# Patient Record
Sex: FEMALE | Race: WHITE | NOT HISPANIC OR LATINO
[De-identification: names, ages, dates, MRNs, and addresses within clinical notes are randomized per-mention and may not be internally consistent; named-entity substitution may affect disease eponyms.]

---

## 2020-03-05 PROBLEM — Z00.00 ENCOUNTER FOR PREVENTIVE HEALTH EXAMINATION: Status: ACTIVE | Noted: 2020-03-05

## 2020-05-19 ENCOUNTER — APPOINTMENT (OUTPATIENT)
Dept: ENDOCRINOLOGY | Facility: CLINIC | Age: 60
End: 2020-05-19
Payer: COMMERCIAL

## 2020-05-19 VITALS
HEART RATE: 65 BPM | SYSTOLIC BLOOD PRESSURE: 115 MMHG | HEIGHT: 59 IN | WEIGHT: 125 LBS | DIASTOLIC BLOOD PRESSURE: 73 MMHG | BODY MASS INDEX: 25.2 KG/M2

## 2020-05-19 DIAGNOSIS — Z87.891 PERSONAL HISTORY OF NICOTINE DEPENDENCE: ICD-10-CM

## 2020-05-19 DIAGNOSIS — Z83.49 FAMILY HISTORY OF OTHER ENDOCRINE, NUTRITIONAL AND METABOLIC DISEASES: ICD-10-CM

## 2020-05-19 PROCEDURE — 36415 COLL VENOUS BLD VENIPUNCTURE: CPT

## 2020-05-19 PROCEDURE — 99205 OFFICE O/P NEW HI 60 MIN: CPT | Mod: 25

## 2020-05-19 RX ORDER — CALCIUM CITRATE/VITAMIN D3 315MG-6.25
TABLET ORAL
Refills: 0 | Status: ACTIVE | COMMUNITY

## 2020-05-19 RX ORDER — MULTIVITAMIN
TABLET ORAL
Refills: 0 | Status: ACTIVE | COMMUNITY

## 2020-05-20 LAB
25(OH)D3 SERPL-MCNC: 42.1 NG/ML
ALBUMIN MFR SERPL ELPH: 65.1 %
ALBUMIN SERPL ELPH-MCNC: 4.7 G/DL
ALBUMIN SERPL-MCNC: 4.5 G/DL
ALBUMIN/GLOB SERPL: 1.9 RATIO
ALBUPE: 14 %
ALP BLD-CCNC: 56 U/L
ALPHA1 GLOB MFR SERPL ELPH: 3.5 %
ALPHA1 GLOB SERPL ELPH-MCNC: 0.2 G/DL
ALPHA1UPE: 41.2 %
ALPHA2 GLOB MFR SERPL ELPH: 8.7 %
ALPHA2 GLOB SERPL ELPH-MCNC: 0.6 G/DL
ALPHA2UPE: 22.5 %
ALT SERPL-CCNC: 17 U/L
ANION GAP SERPL CALC-SCNC: 14 MMOL/L
AST SERPL-CCNC: 24 U/L
B-GLOBULIN MFR SERPL ELPH: 10.6 %
B-GLOBULIN SERPL ELPH-MCNC: 0.7 G/DL
BETAUPE: 16 %
BILIRUB SERPL-MCNC: 0.5 MG/DL
BUN SERPL-MCNC: 15 MG/DL
CALCIUM SERPL-MCNC: 9.9 MG/DL
CALCIUM SERPL-MCNC: 9.9 MG/DL
CHLORIDE SERPL-SCNC: 103 MMOL/L
CO2 SERPL-SCNC: 26 MMOL/L
CREAT SERPL-MCNC: 0.85 MG/DL
GAMMA GLOB FLD ELPH-MCNC: 0.8 G/DL
GAMMA GLOB MFR SERPL ELPH: 12.1 %
GAMMAUPE: 6.3 %
GLUCOSE SERPL-MCNC: 85 MG/DL
IGA 24H UR QL IFE: NORMAL
INTERPRETATION SERPL IEP-IMP: NORMAL
KAPPA LC 24H UR QL: NORMAL
MAGNESIUM SERPL-MCNC: 2.2 MG/DL
PARATHYROID HORMONE INTACT: 48 PG/ML
PHOSPHATE SERPL-MCNC: 3.4 MG/DL
POTASSIUM SERPL-SCNC: 4.3 MMOL/L
PROT PATTERN 24H UR ELPH-IMP: NORMAL
PROT SERPL-MCNC: 6.9 G/DL
PROT UR-MCNC: 10 MG/DL
PROT UR-MCNC: 10 MG/DL
SODIUM SERPL-SCNC: 144 MMOL/L
TSH SERPL-ACNC: 1.7 UIU/ML

## 2020-05-21 LAB — ALP BONE SERPL-MCNC: 8.7 MCG/L

## 2021-08-24 NOTE — ASSESSMENT
[FreeTextEntry1] : Osteopenia. She has no history of fragility fracture. She has a history of prior osteoporosis therapy with risedronate and currently alendronate approaching a five year treatment course. Her last bone density demonstrated a decline at the hip, which may have been due in part to a slight difference in positioning. Her 10 year fracture risk calculated by FRAX using her last bone density is 8.8% for major osteoporotic fracture and 1.0% for hip fracture, below the treatment thresholds. We discussed completion of a metabolic evaluation for secondary causes of bone loss. We discussed the potential benefits and risks of antiresorptive osteoporosis therapy at length, including but not limited to osteonecrosis of the jaw and atypical femoral fracture. We discussed the risks and benefits of a "drug holiday" from antiresorptive therapy and she is amenable pending interval bone density testing in August 2020. \par Metabolic evaluation for secondary causes of osteoporosis\par Calcium 1200 mg daily from diet and supplements (to be taken in divided doses as no more than 500-600 mg can be absorbed at one time); advised decreased supplemental calcium\par Continue current vitamin D regimen pending level\par Diet, exercise and fall prevention discussed\par \par Hypothyroidism. She has been clinically and biochemically euthyroid on her current regimen of levothyroxine. We reviewed proper use and compliance with levothyroxine. \par Continue Synthroid 62.5 mcg daily pending TSH today\par \par I reviewed the DXA performed on August 19, 2019 with the patient today.\par I reviewed the laboratories performed on August 15, 2019 with the patient today.\par I counseled the patient regarding calcium and vitamin D intake today.\par I discussed the following osteoporosis therapies: Alendronate, risedronate, ibandronate, zoledronic acid, denosumab\par \par CC:\par Dr. Victor Manuel Zhang, Fax [phone]

## 2021-08-24 NOTE — PHYSICAL EXAM
[Alert] : alert [Healthy Appearance] : healthy appearance [No Acute Distress] : no acute distress [Normal Sclera/Conjunctiva] : normal sclera/conjunctiva [Normal Oropharynx] : the oropharynx was normal [No Neck Mass] : no neck mass was observed [No LAD] : no lymphadenopathy [Supple] : the neck was supple [No Thyroid Nodules] : no palpable thyroid nodules [Thyroid Not Enlarged] : the thyroid was not enlarged [No Respiratory Distress] : no respiratory distress [Clear to Auscultation] : lungs were clear to auscultation bilaterally [Normal Rate] : heart rate was normal [Normal S1, S2] : normal S1 and S2 [Regular Rhythm] : with a regular rhythm [No Spinal Tenderness] : no spinal tenderness [Normal Gait] : normal gait [No Stigmata of Cushings Syndrome] : no stigmata of Cushings Syndrome [Normal Insight/Judgement] : insight and judgment were intact [Kyphosis] : no kyphosis present [Scoliosis] : no scoliosis [Acanthosis Nigricans] : no acanthosis nigricans [de-identified] : no moon facies, no supraclavicular fat pads [de-identified] : no difficulty balancing on one leg bilaterally

## 2021-08-24 NOTE — HISTORY OF PRESENT ILLNESS
[FreeTextEntry1] : Ms. Jackson is a 59 year-old woman with a history of osteopenia and hypothyroidism presenting to establish care with me. Over 20 pages of previous records reviewed. 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 per notes\par \par Falls: No\par Height loss: No\par Kidney stones: No\par Dental health: Regular appointments, no history of implants, no upcoming procedures planned\par Exercise: Walks 4 miles daily\par Dairy intake: 1 serving daily (chocolate milk, yogurt or cheese daily)\par Calcium supplements: [employer] calcium carbonate 600 mg (vitamin D 500 intl units)\par Multivitamin: One A Day Women 50+ (calcium 300 mg, vitamin D 1000 intl units)\par Vitamin D supplements: in calcium supplement and multivitamin\par \par Osteoporosis risk factors include: Postmenopausal status,  race, prior fracture, falls, height loss, small thin bones, tobacco use, excessive alcohol, anorexia, family history, vitamin D deficiency, corticosteroid use, seizure medications, malabsorption, hyperparathyroidism, hyperthyroidism.\par NEGATIVE EXCEPT: Postmenopausal status,  race\par \par Hypothyroidism.\par She was diagnosed with hypothyroidism over 10 years ago.\par She is taking Synthroid 62.5 mcg daily (1/2 125 mcg tablet) daily.\par She is taking levothyroxine in the morning, on an empty stomach, with plain water, and waiting at least 30 minutes before eating. She is taking calcium/iron/multivitamin and  from levothyroxine by at least 1 hour.\par No history of radiation exposure.\par Mother with history of hyperthyroidism.

## 2021-08-24 NOTE — DATA REVIEWED
[FreeTextEntry1] : Laboratories (August 15, 2019) reviewed and significant for: \par Calcium 9.5 mg/dL (albumin 4.8 g/dL)\par 25-hydroxyvitamin D 40 ng/mL\par eGFR >60 mL/min\par Alkaline phosphatase 52 U/L\par Phosphorus 3.4 mg/dL\par TSH 2.93 uIU/mL

## 2021-08-24 NOTE — RESULTS/DATA
[Hologic] : hologic [Other: ________] : [unfilled] [BMD ___ g/cm2] : BMD: [unfilled] g/cm2 [T-Score ___] : T-score: [unfilled] [FreeTextEntry2] : August 19, 2019 [de-identified] : -2.1% [de-identified] : -3.4%*

## 2021-08-24 NOTE — ADDENDUM
[FreeTextEntry1] : Recent test results as below; unremarkable for a secondary cause of bone loss. TSH within range; refills for Synthroid sent to mail order pharmacy. Serum and urine protein electrophoresis without monoclonal proteins. I left a message for Ms. Breen to discuss these reassuring results. 5/20/20\par \par Recent bone density as below, overall stable from previous. Her 10 year fracture risk calculated by FRAX (if untreated) is 9.4% for major osteoporotic fracture and 1.2% for hip fracture, below the treatment thresholds. We will start a "drug holiday" from antiresorptive therapy. 8/25/20\par \par Ms. Breen needs a dental extraction. I spoke with her dentist, Dr. Miguel Angel Grant. 3/23/21\par \par Synthroid denied by insurance and Ms. Breen had no specific preference over generic levothyroxine. 6/03/21\par \par Recent laboratory results as below. Calciotropic panel within range. TSH within range. I will discuss with Ms. Breen at her upcoming appointment. 8/16/21\par \par Recent bone density as below. Bone density demonstrated a significant improvement at the lumbar spine and a decline at the hip, likely due to positioning. I will discuss with Ms. Breen at her upcoming appointment. 8/24/21\par \par Most recent bone mineral density\par Date: August 19, 2021\par Source: Hologic\par Site: [facility]\par \par Site	BMD (g/cm2)	T-score	Change previous	Change baseline	\par Lumbar spine	1.006	-0.4\par Femoral neck	0.633	-1.9	\par Total hip	                0.817       -1.0       \par Distal radius           	0.558       -2.3         \par DXA Comments: L4 incorrectly excluded on the report\par \par Laboratories (August 10, 2021) reviewed and significant for: \par Calcium 9.7 mg/dL (albumin 4.3 g/dL)\par PTH 64 pg/mL\par 25-hydroxyvitamin D 33 ng/mL\par BUN/creatinine 18/0.88 mg/dL (eGFR 70 mL/min)\par Alkaline phosphatase 70 U/L\par Phosphorus 4.0 mg/dL\par Magnesium 2.2 mg/dL\par TSH 3.47 uIU/mL\par \par Most recent bone mineral density\par Date: August 19, 2020\par Source: Hologic\par Site: [facility]\par \par Site	BMD (g/cm2)	T-score	Change previous	Change baseline	\par Lumbar spine	0.962	-0.8\par Femoral neck	0.626	-2.0	\par Total hip	                0.876       -0.5         \par Distal radius           	0.559       -2.3         \par DXA Comments:

## 2021-08-26 ENCOUNTER — NON-APPOINTMENT (OUTPATIENT)
Age: 61
End: 2021-08-26

## 2021-08-26 ENCOUNTER — APPOINTMENT (OUTPATIENT)
Dept: ENDOCRINOLOGY | Facility: CLINIC | Age: 61
End: 2021-08-26
Payer: COMMERCIAL

## 2021-08-26 VITALS
DIASTOLIC BLOOD PRESSURE: 68 MMHG | HEART RATE: 54 BPM | SYSTOLIC BLOOD PRESSURE: 105 MMHG | WEIGHT: 123 LBS | BODY MASS INDEX: 24.8 KG/M2 | HEIGHT: 59 IN

## 2021-08-26 PROCEDURE — 99214 OFFICE O/P EST MOD 30 MIN: CPT

## 2021-08-26 RX ORDER — ALENDRONATE SODIUM 70 MG/1
70 TABLET ORAL
Refills: 0 | Status: DISCONTINUED | COMMUNITY
End: 2021-08-26

## 2022-07-26 ENCOUNTER — RX RENEWAL (OUTPATIENT)
Age: 62
End: 2022-07-26

## 2022-08-24 NOTE — HISTORY OF PRESENT ILLNESS
[FreeTextEntry1] : Ms. Jackson is a 61 year-old woman with a history of osteopenia and hypothyroidism presenting for follow-up of her endocrine issues. 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 per notes\par \par Falls: No\par Height loss: No\par Kidney stones: No\par Dental health: Regular appointments, no history of implants, no upcoming procedures planned\par Exercise: Walks 4 miles daily\par Dairy intake: 1 serving daily (chocolate milk, yogurt or cheese daily)\par Calcium supplements: [employer] calcium carbonate 600 mg (vitamin D 500 intl units)\par Multivitamin: One A Day Women 50+ (calcium 300 mg, vitamin D 1000 intl units)\par Vitamin D supplements: in calcium supplement and multivitamin\par \par Osteoporosis risk factors include: Postmenopausal status,  race, prior fracture, falls, height loss, small thin bones, tobacco use, excessive alcohol, anorexia, family history, vitamin D deficiency, corticosteroid use, seizure medications, malabsorption, hyperparathyroidism, hyperthyroidism.\par NEGATIVE EXCEPT: Postmenopausal status,  race\par \par Hypothyroidism.\par She was diagnosed with hypothyroidism over 10 years ago.\par She is taking Synthroid 62.5 mcg daily (1/2 125 mcg tablet) daily; her specialty pharmacy is giving her brand-name despite being written for generic.\par She is taking levothyroxine in the morning, on an empty stomach, with plain water, and waiting at least 30 minutes before eating. She is taking calcium/iron/multivitamin and  from levothyroxine by at least one hour.\par No history of radiation exposure.\par Mother with history of hyperthyroidism.\par \par Interim History \par She had a right heel fracture in May 2021 jumping off her bed; she has had good healing and is now without pain. \par She needed a dental extraction and crown placements. \par Recent laboratory results as below. Calciotropic panel within range. TSH within range. \par Recent bone density as below. Bone density demonstrated an improvement at the lumbar spine and a decline at the hip, likely due to positioning. \par She feels well today. She feels as it she has more energy than previous. \par Medical and surgical history, medications, allergies, social and family history reviewed and updated as needed.

## 2022-08-24 NOTE — DATA REVIEWED
[FreeTextEntry1] : Laboratories (August 10, 2021) reviewed and significant for: \par Calcium 9.7 mg/dL (albumin 4.3 g/dL)\par PTH 64 pg/mL\par 25-hydroxyvitamin D 33 ng/mL\par BUN/creatinine 18/0.88 mg/dL (eGFR 70 mL/min)\par Alkaline phosphatase 70 U/L\par Phosphorus 4.0 mg/dL\par Magnesium 2.2 mg/dL\par TSH 3.47 uIU/mL\par \par Most recent bone mineral density\par Date: August 19, 2021\par Source: [employer]\par Site: [facility]\par \par Site	BMD (g/cm2)	T-score	Change previous	Change baseline	\par Lumbar spine	1.006	-0.4\par Femoral neck	0.633	-1.9	\par Total hip	                0.817       -1.0       \par Distal radius           	0.558       -2.3         \par DXA Comments: L4 incorrectly excluded on the report\par \par Most recent bone mineral density\par Date: August 19, 2020\par Source: [employer]\par Site: [facility]\par \par Site	BMD (g/cm2)	T-score	Change previous	Change baseline	\par Lumbar spine	0.962	-0.8\par Femoral neck	0.626	-2.0	\par Total hip	                0.876       -0.5         \par Distal radius           	0.559       -2.3         \par DXA Comments:

## 2022-08-24 NOTE — ADDENDUM
[FreeTextEntry1] : Recent laboratory results ordered by her primary care provider and our office as below. PTH borderline elevated with normal serum calcium; calciotropic panel otherwise within range. I will discuss with Ms. Breen at her upcoming appointment. 8/11/22\par \par Recent bone density as below. Bone density demonstrated a significant improvement at the total hip from previous, other sites overall stable. I will discuss with Ms. Breen at her upcoming appointment. 8/24/22\par \par Laboratories (August 8, 2022) reviewed and significant for: \par Unremarkable complete blood count\par Calcium 9.3 mg/dL (albumin 4.3 g/dL)\par PTH 80 pg/mL (normal: 16-77)\par 25-hydroxyvitamin D 42 ng/mL\par BUN/creatinine 15/0.82 mg/dL (eGFR 80 mL/min)\par Alkaline phosphatase 63 U/L\par Bone-specific alkaline phosphatase 11.6 mcg/L\par Phosphorus 3.7 mg/dL\par Magnesium 2.2 mg/dL\par \par Most recent bone mineral density\par Date: August 18, 2022\par Source: Hologic\par Site: [facility]\par \par Site      BMD (g/cm2)  T-score            Change previous         Change baseline         \par Lumbar spine  0.964   -0.5                 +0.9% \par Femoral neck  0.627   -2.0      \par Total hip          0.864   -0.6                 +5.8%* \par Distal radius   0.548   -2.4                  -1.8%\par DXA comments: *Significant change from previous; L4 excluded\par Vertebral fracture assessment: No vertebral compression fractures from T9 to L5 (my read)\par \par Impression: I have personally reviewed the DXA images and report, and I compared these images to those from a DXA dated August 19, 2021 from [facility]. There is osteopenia at the femoral neck and distal radius. There was a significant improvement at the total hip due in large part to positioning; no other significant changes from previous. No vertebral fractures noted on vertebral fracture assessment.

## 2022-08-24 NOTE — PHYSICAL EXAM
[Alert] : alert [Healthy Appearance] : healthy appearance [No Acute Distress] : no acute distress [Normal Sclera/Conjunctiva] : normal sclera/conjunctiva [Normal Hearing] : hearing was normal [Normal Oropharynx] : the oropharynx was normal [No Respiratory Distress] : no respiratory distress [No Spinal Tenderness] : no spinal tenderness [No Stigmata of Cushings Syndrome] : no stigmata of Cushings Syndrome [Normal Gait] : normal gait [Normal Insight/Judgement] : insight and judgment were intact [Kyphosis] : no kyphosis present [Scoliosis] : no scoliosis [Acanthosis Nigricans] : no acanthosis nigricans [de-identified] : no moon facies, no supraclavicular fat pads

## 2022-08-24 NOTE — ASSESSMENT
[FreeTextEntry1] : Osteopenia. She has a history of traumatic heel fracture; no history of fragility fracture. She has a history of prior osteoporosis therapy with risedronate and alendronate for a five year treatment course. We started a "drug holiday" from antiresorptive therapy in 2020. Metabolic evaluation for secondary causes of bone loss previously unremarkable. Recent bone density demonstrated an improvement at the lumbar spine and a decline at the hip, likely due to positioning. We will continue a "drug holiday" from antiresorptive therapy for now.\par Calcium 1200 mg daily from diet and supplements (to be taken in divided doses as no more than 500-600 mg can be absorbed at one time)\par Continue current vitamin D regimen\par Diet, exercise and fall prevention discussed\par \par Hypothyroidism. She has been clinically and biochemically euthyroid on her current regimen of levothyroxine. We reviewed proper use and compliance with levothyroxine. \par Continue Synthroid 62.5 mcg daily\par \par Next bone density testin year\par Next appointment: 1 year or earlier as needed\par \par I reviewed the DXA performed on August 10, 2021 with the patient today.\par I reviewed the laboratories performed on 2021 with the patient today.\par I counseled the patient regarding calcium and vitamin D intake today.\par \par CC:\par Dr. Victor Manuel Zhang, Fax [phone]

## 2022-08-31 ENCOUNTER — APPOINTMENT (OUTPATIENT)
Dept: ENDOCRINOLOGY | Facility: CLINIC | Age: 62
End: 2022-08-31

## 2022-08-31 VITALS
SYSTOLIC BLOOD PRESSURE: 102 MMHG | WEIGHT: 128 LBS | DIASTOLIC BLOOD PRESSURE: 67 MMHG | HEART RATE: 51 BPM | BODY MASS INDEX: 25.85 KG/M2

## 2022-08-31 PROCEDURE — 99214 OFFICE O/P EST MOD 30 MIN: CPT

## 2023-03-06 NOTE — DATA REVIEWED
[FreeTextEntry1] : Laboratories (August 8, 2022) reviewed and significant for: \par Unremarkable complete blood count\par Calcium 9.3 mg/dL (albumin 4.3 g/dL)\par PTH 80 pg/mL (normal: 16-77)\par 25-hydroxyvitamin D 42 ng/mL\par BUN/creatinine 15/0.82 mg/dL (eGFR 80 mL/min)\par Alkaline phosphatase 63 U/L\par Bone-specific alkaline phosphatase 11.6 mcg/L\par Phosphorus 3.7 mg/dL\par Magnesium 2.2 mg/dL\par TSH 2.99 uIU/mL\par \par Most recent bone mineral density\par Date: August 18, 2022\par Source: [email or web address]\par Site: [facility]\par \par Site      BMD (g/cm2)  T-score            Change previous         Change baseline         \par Lumbar spine  0.964   -0.5                 +0.9% \par Femoral neck  0.627   -2.0      \par Total hip          0.864   -0.6                 +5.8%* \par Distal radius   0.548   -2.4                  -1.8%\par DXA comments: *Significant change from previous; L4 excluded\par Vertebral fracture assessment: No vertebral compression fractures from T9 to L5 (my read)\par \par Impression: I have personally reviewed the DXA images and report, and I compared these images to those from a DXA dated August 19, 2021 from [facility]. There is osteopenia at the femoral neck and distal radius. There was a significant improvement at the total hip due in large part to positioning; no other significant changes from previous. No vertebral fractures noted on vertebral fracture assessment.

## 2023-03-06 NOTE — ASSESSMENT
[FreeTextEntry1] : Osteopenia. She has a history of traumatic heel fracture; no history of fragility fracture. She has a history of prior osteoporosis therapy with risedronate and alendronate for a five year treatment course. We started a "drug holiday" from antiresorptive therapy in 2020. Metabolic evaluation for secondary causes of bone loss previously unremarkable. Recent bone density demonstrated an improvement at the total hip due in large part to positioning; other sites stable. Her 10 year fracture risk calculated by FRAX is 10% for major osteoporotic fracture and 1.3% for hip fracture, below the treatment thresholds. She was noted to have borderline hyperparathyroidism on most recent laboratory testing with normal serum calcium and we will monitor. We will continue a "drug holiday" from antiresorptive therapy for now.\par Calcium 1200 mg daily from diet and supplements (to be taken in divided doses as no more than 500-600 mg can be absorbed at one time)\par Continue current vitamin D regimen\par Diet, exercise and fall prevention discussed\par \par Hypothyroidism. She has been clinically and biochemically euthyroid on her current regimen of levothyroxine. We reviewed proper use and compliance with levothyroxine. \par Continue Synthroid 62.5 mcg daily\par \par Next bone density testin year\par Next appointment: 1 year or earlier as needed\par \par I reviewed the DXA performed on 2022 with the patient today.\par I reviewed the laboratories performed on 2022 with the patient today.\par I counseled the patient regarding calcium and vitamin D intake today.\par \par CC:\par Dr. Victor Manuel Zhang, Fax [phone]

## 2023-03-06 NOTE — HISTORY OF PRESENT ILLNESS
[FreeTextEntry1] : Ms. Jackson is a 62 year-old woman with a history of osteopenia and hypothyroidism presenting for follow-up of her endocrine issues. 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 per notes\par \par Falls: No\par Height loss: No\par Kidney stones: No\par Dental health: Regular appointments, no history of implants, no upcoming procedures planned\par Exercise: Walks 3 miles daily\par Dairy intake: 1-2 serving daily (glass of milk daily, yogurt or cheese daily)\par Calcium supplements: [employer] calcium carbonate 600 mg (vitamin D 500 intl units)\par Multivitamin: Varies\par Vitamin D supplements: in calcium supplement and multivitamin\par \par Osteoporosis risk factors include: Postmenopausal status,  race, prior fracture, falls, height loss, small thin bones, tobacco use, excessive alcohol, anorexia, family history, vitamin D deficiency, corticosteroid use, seizure medications, malabsorption, hyperparathyroidism, hyperthyroidism.\par NEGATIVE EXCEPT: Postmenopausal status,  race\par \par Hypothyroidism.\par She was diagnosed with hypothyroidism over 10 years ago.\par She is taking Synthroid 62.5 mcg daily (1/2 125 mcg tablet) daily; her specialty pharmacy is giving her brand-name despite being written for generic.\par She is taking levothyroxine in the morning, on an empty stomach, with plain water, and waiting at least 30 minutes before eating. She is taking calcium/iron/multivitamin and  from levothyroxine by at least one hour.\par No history of radiation exposure.\par Mother with history of hyperthyroidism.\par \par Interim History \par Recent laboratory results as below. PTH borderline elevated with normal serum calcium; calciotropic panel otherwise within range. TSH within range. \par Recent bone density as below. Bone density demonstrated a significant improvement at the total hip from previous, other sites overall stable. Bone density remains in the osteopenic range at all sites.\par She feels well today. No issues.\par Medical and surgical history, medications, allergies, social and family history reviewed and updated as needed.

## 2023-03-06 NOTE — PHYSICAL EXAM
[Alert] : alert [Healthy Appearance] : healthy appearance [No Acute Distress] : no acute distress [Normal Sclera/Conjunctiva] : normal sclera/conjunctiva [Normal Hearing] : hearing was normal [Normal Oropharynx] : the oropharynx was normal [No Respiratory Distress] : no respiratory distress [No Spinal Tenderness] : no spinal tenderness [No Stigmata of Cushings Syndrome] : no stigmata of Cushings Syndrome [Normal Gait] : normal gait [Normal Insight/Judgement] : insight and judgment were intact [Kyphosis] : no kyphosis present [Scoliosis] : no scoliosis [Acanthosis Nigricans] : no acanthosis nigricans [de-identified] : no moon facies, no supraclavicular fat pads

## 2023-03-06 NOTE — ADDENDUM
[FreeTextEntry1] : Synthroid brand not covered by insurance and we will switch to generic levothyroxine, with plan to repeat TSH in 8-12 weeks. 9/02/22\par \par Recent laboratory results as below; discussed with Ms. Breen. PTH borderline elevated; recommended additional vitamin D 1000 intl units daily and we will monitor. TSH above goal and recommended adjustment in levothyroxine from 62.5 mcg to 75 mcg daily. We will plan to repeat blood tests in 12 weeks. 12/09/22\par \par Recent laboratory results as below; discussed with Ms. Breen. TSH and PTH now within the goal ranges on levothyroxine 75 mcg daily and additional vitamin D 1000 intl units daily, respectively. other test results within range. 3/06/23\par \par Laboratories (February 28, 2023) reviewed and significant for: \par Unremarkable complete blood count\par Calcium 9.9 mg/dL (albumin 4.3 g/dL)\par 25-hydroxyvitamin D 39 ng/mL\par PTH 76 pg/mL (normal: 16-77)\par BUN/creatinine 16/0.82 mg/dL (eGFR 80 mL/min)\par Alkaline phosphatase 68 U/L\par Phosphorus 4.3 mg/dL\par Magnesium 2.0 mg/dL\par TSH 2.48 uIU/mL (normal: 0.40-4.50)\par \par Laboratories (December 6, 2022) reviewed and significant for: \par Unremarkable complete blood count\par Calcium 9.6 mg/dL (albumin 4.3 g/dL)\par 25-hydroxyvitamin D 35 ng/mL\par PTH 79 pg/mL (normal: 16-77)\par BUN/creatinine 18/0.85 mg/dL (eGFR 77 mL/min)\par Alkaline phosphatase 63 U/L\par Phosphorus 4.3 mg/dL\par Magnesium 2.1 mg/dL\par TSH 5.66 uIU/mL (normal: 0.40-4.50)

## 2023-07-10 ENCOUNTER — TRANSCRIPTION ENCOUNTER (OUTPATIENT)
Age: 63
End: 2023-07-10

## 2023-07-17 ENCOUNTER — RX RENEWAL (OUTPATIENT)
Age: 63
End: 2023-07-17

## 2023-08-26 ENCOUNTER — TRANSCRIPTION ENCOUNTER (OUTPATIENT)
Age: 63
End: 2023-08-26

## 2023-08-29 ENCOUNTER — NON-APPOINTMENT (OUTPATIENT)
Age: 63
End: 2023-08-29

## 2023-08-30 ENCOUNTER — APPOINTMENT (OUTPATIENT)
Dept: ENDOCRINOLOGY | Facility: CLINIC | Age: 63
End: 2023-08-30
Payer: COMMERCIAL

## 2023-08-30 VITALS
DIASTOLIC BLOOD PRESSURE: 53 MMHG | SYSTOLIC BLOOD PRESSURE: 118 MMHG | BODY MASS INDEX: 26.21 KG/M2 | WEIGHT: 130 LBS | HEIGHT: 59 IN | HEART RATE: 53 BPM

## 2023-08-30 DIAGNOSIS — M85.80 OTHER SPECIFIED DISORDERS OF BONE DENSITY AND STRUCTURE, UNSPECIFIED SITE: ICD-10-CM

## 2023-08-30 DIAGNOSIS — E03.9 HYPOTHYROIDISM, UNSPECIFIED: ICD-10-CM

## 2023-08-30 DIAGNOSIS — H40.9 UNSPECIFIED GLAUCOMA: ICD-10-CM

## 2023-08-30 PROCEDURE — 99214 OFFICE O/P EST MOD 30 MIN: CPT

## 2023-08-30 RX ORDER — DORZOLAMIDE HYDROCHLORIDE TIMOLOL MALEATE 20; 5 MG/ML; MG/ML
SOLUTION/ DROPS OPHTHALMIC
Refills: 0 | Status: ACTIVE | COMMUNITY

## 2023-08-30 RX ORDER — BRIMONIDINE TARTRATE, TIMOLOL MALEATE 2; 5 MG/ML; MG/ML
SOLUTION/ DROPS OPHTHALMIC
Refills: 0 | Status: DISCONTINUED | COMMUNITY
End: 2023-08-30

## 2023-09-08 NOTE — HISTORY OF PRESENT ILLNESS
[FreeTextEntry1] : Ms. Jackson is a 63 year-old woman with a history of osteopenia and hypothyroidism presenting for follow-up of her endocrine issues. I saw her for an initial visit in May 2020 and last in August 2022.  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 per notes  Falls: No Height loss: No Kidney stones: No Dental health: Regular appointments, no history of implants, no upcoming procedures planned Exercise: Walks 3 miles daily Dairy intake: 1-2 serving daily (glass of milk daily, yogurt or cheese daily) Calcium supplements: [employer] calcium carbonate 600 mg (vitamin D 500 intl units) Multivitamin: Varies Vitamin D supplements: 1000 intl units + in calcium supplement and multivitamin  Osteoporosis risk factors include: Postmenopausal status,  race, prior fracture, falls, height loss, small thin bones, tobacco use, excessive alcohol, anorexia, family history, vitamin D deficiency, corticosteroid use, seizure medications, malabsorption, hyperparathyroidism, hyperthyroidism. NEGATIVE EXCEPT: Postmenopausal status,  race  Hypothyroidism. She was diagnosed with hypothyroidism over 10 years ago. She was taking Synthroid 62.5 mcg daily (1/2 125 mcg tablet) daily; her specialty pharmacy was giving her brand-name despite being written for generic. Brand-name no longer covered and we switched to generic levothyroxine. We adjusted to levothyroxine 75 mcg daily in December 2022. She is taking levothyroxine in the morning, on an empty stomach, with plain water, and waiting at least 30 minutes before eating. She is taking calcium/iron/multivitamin and  from levothyroxine by at least one hour. No history of radiation exposure. Mother with history of hyperthyroidism.  Interim History  Laboratory results from December 2022 as below. PTH borderline elevated; recommended additional vitamin D 1000 intl units daily. TSH above goal and recommended adjustment in levothyroxine from 62.5 mcg to 75 mcg daily.  Laboratory results from March 2023 as below. TSH and PTH now the goal ranges on levothyroxine 75 mcg daily and additional vitamin D 1000 intl units daily, respectively. Other test results within range.  Recent bone density as below. There is osteopenia by the World Health Organization criteria. While not directly comparable, bone density appeared overall stable from previous. The change at the distal radius may be due at least in part to positioning.  She feels well today. No issues. She hopes to travel to [place] soon.  Medical and surgical history, medications, allergies, social and family history reviewed and updated as needed.

## 2023-09-08 NOTE — ADDENDUM
[FreeTextEntry1] : Recent TSH within the normal range as below; discussed with Ms. Breen. 9/08/23  Laboratories (September 7, 2023) reviewed and significant for: TSH 2.87 uIU/mL

## 2023-09-08 NOTE — ASSESSMENT
[FreeTextEntry1] : Osteopenia. She has a history of traumatic heel fracture; no history of fragility fracture. She has a history of prior osteoporosis therapy with risedronate and alendronate for a five year treatment course. We started a "drug holiday" from antiresorptive therapy in 2020. Metabolic evaluation for secondary causes of bone loss previously unremarkable. Recent bone density overall stable from previous. Her 10 year fracture risk calculated by FRAX is 9.8% for major osteoporotic fracture and 1.2% for hip fracture, below the treatment thresholds. She was noted to have borderline hyperparathyroidism resolved with increased vitamin D supplementation. We will continue a "drug holiday" from antiresorptive therapy for now. Calcium 8458-8377 mg daily from diet and supplements (to be taken in divided doses as no more than 500-600 mg can be absorbed at one time) Continue current vitamin D regimen Diet, exercise and fall prevention discussed  Hypothyroidism. She has been clinically and biochemically euthyroid on her current regimen of levothyroxine. We reviewed proper use and compliance with levothyroxine.  Continue levothyroxine 75 mcg daily   Next bone density testin year Next appointment: 1 year or earlier as needed  I reviewed the DXA performed on 2023 with the patient today. I reviewed the laboratories performed on 2023 with the patient today. I counseled the patient regarding calcium and vitamin D intake today.  CC: Dr. Victor Manuel Zhang, Fax [phone]

## 2023-09-08 NOTE — PHYSICAL EXAM
[Alert] : alert [Healthy Appearance] : healthy appearance [No Acute Distress] : no acute distress [Normal Sclera/Conjunctiva] : normal sclera/conjunctiva [Normal Hearing] : hearing was normal [Normal Oropharynx] : the oropharynx was normal [No Respiratory Distress] : no respiratory distress [No Spinal Tenderness] : no spinal tenderness [No Stigmata of Cushings Syndrome] : no stigmata of Cushings Syndrome [Normal Gait] : normal gait [Normal Insight/Judgement] : insight and judgment were intact [Kyphosis] : no kyphosis present [Scoliosis] : no scoliosis [Acanthosis Nigricans] : no acanthosis nigricans [de-identified] : no moon facies, no supraclavicular fat pads

## 2023-10-02 RX ORDER — LEVOTHYROXINE SODIUM 0.07 MG/1
75 TABLET ORAL
Qty: 90 | Refills: 3 | Status: ACTIVE | COMMUNITY
Start: 2022-07-26 | End: 1900-01-01

## 2024-08-29 ENCOUNTER — APPOINTMENT (OUTPATIENT)
Dept: ENDOCRINOLOGY | Facility: CLINIC | Age: 64
End: 2024-08-29
Payer: MEDICARE

## 2024-08-29 VITALS
HEART RATE: 47 BPM | WEIGHT: 127 LBS | BODY MASS INDEX: 25.65 KG/M2 | DIASTOLIC BLOOD PRESSURE: 66 MMHG | SYSTOLIC BLOOD PRESSURE: 96 MMHG

## 2024-08-29 DIAGNOSIS — E03.9 HYPOTHYROIDISM, UNSPECIFIED: ICD-10-CM

## 2024-08-29 DIAGNOSIS — M85.80 OTHER SPECIFIED DISORDERS OF BONE DENSITY AND STRUCTURE, UNSPECIFIED SITE: ICD-10-CM

## 2024-08-29 PROCEDURE — 99204 OFFICE O/P NEW MOD 45 MIN: CPT

## 2024-08-29 PROCEDURE — G2211 COMPLEX E/M VISIT ADD ON: CPT

## 2024-08-29 PROCEDURE — 36415 COLL VENOUS BLD VENIPUNCTURE: CPT

## 2024-08-29 RX ORDER — BRIMONIDINE TARTRATE, TIMOLOL MALEATE 2; 5 MG/ML; MG/ML
SOLUTION/ DROPS OPHTHALMIC
Refills: 0 | Status: ACTIVE | COMMUNITY

## 2024-08-29 RX ORDER — BIMATOPROST 0.1 MG/ML
SOLUTION/ DROPS OPHTHALMIC
Refills: 0 | Status: ACTIVE | COMMUNITY

## 2024-08-30 ENCOUNTER — TRANSCRIPTION ENCOUNTER (OUTPATIENT)
Age: 64
End: 2024-08-30

## 2024-08-30 LAB — TSH SERPL-ACNC: 1.53 UIU/ML

## 2024-08-30 NOTE — DATA REVIEWED
[FreeTextEntry1] : Laboratories (August 21, 2024) reviewed and significant for:  Unremarkable complete blood count Calcium 9.7 mg/dL (albumin 4.3 g/dL) BUN/creatinine 21/0.81 mg/dL (eGFR 79 mL/min) Alkaline phosphatase 65 U/L 25-hydroxyvitamin D 49 ng/mL  Most recent bone mineral density Date: August 19, 2023 Source: [facility] Site: [facility]  Site BMD T-score Change previous Change baseline  Lumbar spine 0.999 -0.4    Femoral neck 0.639 -1.9   Total hip 0.848 -0.8    Distal radius 0.574 -2.0    DXA comments: Baseline scan at this facility; left hip values Vertebral fracture assessment without evidence of compression fractures T7 to L5 (my read)  Impression: I have personally reviewed the DXA images and report, and I compared these images to a DXA dated August 18, 2022 from [facility]. There is osteopenia by the World Health Organization criteria. While not directly comparable, bone density is overall stable from previous. The possible change at the distal radius may be due at least in part to positioning.

## 2024-08-30 NOTE — ASSESSMENT
[FreeTextEntry1] : Hypothyroidism. She has been clinically and biochemically euthyroid on her current regimen of levothyroxine. We have reviewed proper use and compliance with levothyroxine.  Continue levothyroxine 75 mcg daily pending TSH level; she will need refills pending results  Osteopenia. She has a history of traumatic heel fracture; no history of fragility fracture. She has a history of prior osteoporosis therapy with risedronate and alendronate for a five year treatment course. We started a "drug holiday" from antiresorptive therapy in August 2020. Metabolic evaluation for secondary causes of bone loss previously unremarkable. Last bone density overall stable from previous. Her 10 year fracture risk calculated by FRAX was 9.8% for major osteoporotic fracture and 1.2% for hip fracture, below the treatment thresholds. She was noted to have borderline hyperparathyroidism resolved with increased vitamin D supplementation. We will continue a "drug holiday" from antiresorptive therapy for now. Calcium 0203-3272 mg daily from diet and supplements (to be taken in divided doses as no more than 500-600 mg can be absorbed at one time) Continue current vitamin D regimen Diet, exercise and fall prevention discussed  I reviewed the DXA performed on August 18, 2023 with the patient today. I reviewed the laboratories performed on August 21, 2024 with the patient today. I counseled the patient regarding calcium and vitamin D intake today.  CC: Dr. Victor Manuel Zhang, Fax [phone]
[FreeTextEntry1] : Hypothyroidism. She has been clinically and biochemically euthyroid on her current regimen of levothyroxine. We have reviewed proper use and compliance with levothyroxine.  Continue levothyroxine 75 mcg daily pending TSH level; she will need refills pending results  Osteopenia. She has a history of traumatic heel fracture; no history of fragility fracture. She has a history of prior osteoporosis therapy with risedronate and alendronate for a five year treatment course. We started a "drug holiday" from antiresorptive therapy in August 2020. Metabolic evaluation for secondary causes of bone loss previously unremarkable. Last bone density overall stable from previous. Her 10 year fracture risk calculated by FRAX was 9.8% for major osteoporotic fracture and 1.2% for hip fracture, below the treatment thresholds. She was noted to have borderline hyperparathyroidism resolved with increased vitamin D supplementation. We will continue a "drug holiday" from antiresorptive therapy for now. Calcium 5334-3665 mg daily from diet and supplements (to be taken in divided doses as no more than 500-600 mg can be absorbed at one time) Continue current vitamin D regimen Diet, exercise and fall prevention discussed  I reviewed the DXA performed on August 18, 2023 with the patient today. I reviewed the laboratories performed on August 21, 2024 with the patient today. I counseled the patient regarding calcium and vitamin D intake today.  CC: Dr. Victor Manuel Zhang, Fax [phone]
<<-----Click here for Discharge Medication Review

## 2024-08-30 NOTE — PHYSICAL EXAM
[Alert] : alert [Healthy Appearance] : healthy appearance [No Acute Distress] : no acute distress [Normal Sclera/Conjunctiva] : normal sclera/conjunctiva [Normal Hearing] : hearing was normal [Normal Oropharynx] : the oropharynx was normal [No Respiratory Distress] : no respiratory distress [No Spinal Tenderness] : no spinal tenderness [No Stigmata of Cushings Syndrome] : no stigmata of Cushings Syndrome [Normal Gait] : normal gait [Normal Insight/Judgement] : insight and judgment were intact [Kyphosis] : no kyphosis present [Scoliosis] : no scoliosis [Acanthosis Nigricans] : no acanthosis nigricans [de-identified] : no moon facies, no supraclavicular fat pads

## 2024-08-30 NOTE — ADDENDUM
[FreeTextEntry1] : Recent results as below; discussed with Ms. Breen. TSH within the goal range on her current regimen of levothyroxine. Recent bone density as below. There is osteopenia by the World Health Organization criteria. There was a significant decline at the lumbar spine L1-L3 from previous; when L4 is included, there was a -1.5% nonsignificant change at the lumbar spine from previous. Vertebral fracture assessment without evidence of compression fractures. Her 10 year fracture risk calculated by FRAX is 17% for major osteoporotic fracture and 2.5% for hip fracture, below the treatment thresholds. We will continue a "drug holiday" from antiresorptive therapy. 8/30/24  Most recent bone mineral density Date: August 19, 2024 Source: [facility] Site: [facility]  Site	BMD	T-score	Change previous	Change baseline	 Lumbar spine	0.895	-1.1		-7.2%*	 Femoral neck	0.639	-1.9			 Total hip           0.876	-0.5		+1.3%	 Distal radius	0.558	-2.3		+1.8%	 DXA comments: *Significant change from previous; L4 excluded; left hip values Vertebral fracture assessment without evidence of compression fractures T10 to L5 (my read)  Impression: I have personally reviewed the DXA images and report, and I compared these images to a DXA dated August 18, 2022 from [facility]. There is osteopenia by the World Health Organization criteria. There was a significant decline at the lumbar spine L1-L3 from previous; when L4 is included, there was a -1.5% nonsignificant change at the lumbar spine from previous. Vertebral fracture assessment without evidence of compression fractures.

## 2024-08-30 NOTE — HISTORY OF PRESENT ILLNESS
[FreeTextEntry1] : Ms. Jackson is a 64 year-old woman with a history of osteopenia and hypothyroidism presenting for follow-up of her endocrine issues. I saw her for an initial visit in May 2020 and last in August 2023.  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 per notes  Falls: No Height loss: No Kidney stones: No Dental health: Regular appointments, no history of implants, no upcoming procedures planned Exercise: Walks 3 miles daily Dairy intake: 1-2 serving daily (glass of milk daily, yogurt or cheese daily) Calcium supplements: [employer] calcium carbonate 600 mg (vitamin D 500 intl units) Multivitamin: Varies Vitamin D supplements: 1000 intl units + in calcium supplement and multivitamin  Osteoporosis risk factors include: Postmenopausal status,  race, prior fracture, falls, height loss, small thin bones, tobacco use, excessive alcohol, anorexia, family history, vitamin D deficiency, corticosteroid use, seizure medications, malabsorption, hyperparathyroidism, hyperthyroidism. NEGATIVE EXCEPT: Postmenopausal status,  race  Hypothyroidism. She was diagnosed with hypothyroidism over 10 years ago. She was taking Synthroid 62.5 mcg daily (1/2 125 mcg tablet) daily; her specialty pharmacy was giving her brand-name despite being written for generic. Brand-name no longer on formulary and we switched to generic levothyroxine. We adjusted to levothyroxine 75 mcg daily in December 2022. She is taking levothyroxine in the morning, on an empty stomach, with plain water, and waiting at least 30 minutes before eating. She is taking calcium/iron/multivitamin and  from levothyroxine by at least one hour. No history of radiation exposure. Mother with history of hyperthyroidism.  Interim History  She is undergoing dental implant placement.  Recent laboratory results ordered by her primary care provider significant for the below; see scanned report. Serum calcium, renal function, and 25-hydroxyvitamin D within range.  She feels well today. No issues. She was in [facility] this summer. Medical and surgical history, medications, allergies, social and family history reviewed and updated as needed.

## 2024-08-30 NOTE — HISTORY OF PRESENT ILLNESS
[FreeTextEntry1] : Ms. Jackson is a 64 year-old woman with a history of osteopenia and hypothyroidism presenting for follow-up of her endocrine issues. I saw her for an initial visit in May 2020 and last in August 2023.  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 per notes  Falls: No Height loss: No Kidney stones: No Dental health: Regular appointments, no history of implants, no upcoming procedures planned Exercise: Walks 3 miles daily Dairy intake: 1-2 serving daily (glass of milk daily, yogurt or cheese daily) Calcium supplements: [employer] calcium carbonate 600 mg (vitamin D 500 intl units) Multivitamin: Varies Vitamin D supplements: 1000 intl units + in calcium supplement and multivitamin  Osteoporosis risk factors include: Postmenopausal status,  race, prior fracture, falls, height loss, small thin bones, tobacco use, excessive alcohol, anorexia, family history, vitamin D deficiency, corticosteroid use, seizure medications, malabsorption, hyperparathyroidism, hyperthyroidism. NEGATIVE EXCEPT: Postmenopausal status,  race  Hypothyroidism. She was diagnosed with hypothyroidism over 10 years ago. She was taking Synthroid 62.5 mcg daily (1/2 125 mcg tablet) daily; her specialty pharmacy was giving her brand-name despite being written for generic. Brand-name no longer on formulary and we switched to generic levothyroxine. We adjusted to levothyroxine 75 mcg daily in December 2022. She is taking levothyroxine in the morning, on an empty stomach, with plain water, and waiting at least 30 minutes before eating. She is taking calcium/iron/multivitamin and  from levothyroxine by at least one hour. No history of radiation exposure. Mother with history of hyperthyroidism.  Interim History  She is undergoing dental implant placement.  Recent laboratory results ordered by her primary care provider significant for the below; see scanned report. Serum calcium, renal function, and 25-hydroxyvitamin D within range.  She feels well today. No issues. She was in [facility] this summer. Medical and surgical history, medications, allergies, social and family history reviewed and updated as needed.

## 2024-08-30 NOTE — DATA REVIEWED
[FreeTextEntry1] : Laboratories (August 21, 2024) reviewed and significant for:  Unremarkable complete blood count Calcium 9.7 mg/dL (albumin 4.3 g/dL) BUN/creatinine 21/0.81 mg/dL (eGFR 79 mL/min) Alkaline phosphatase 65 U/L 25-hydroxyvitamin D 49 ng/mL  Most recent bone mineral density Date: August 19, 2023 Source: [employer] Site: [facility]  Site BMD T-score Change previous Change baseline  Lumbar spine 0.999 -0.4    Femoral neck 0.639 -1.9   Total hip 0.848 -0.8    Distal radius 0.574 -2.0    DXA comments: Baseline scan at this facility; left hip values Vertebral fracture assessment without evidence of compression fractures T7 to L5 (my read)  Impression: I have personally reviewed the DXA images and report, and I compared these images to a DXA dated August 18, 2022 from [facility]. There is osteopenia by the World Health Organization criteria. While not directly comparable, bone density is overall stable from previous. The possible change at the distal radius may be due at least in part to positioning.

## 2024-08-30 NOTE — PHYSICAL EXAM
[Alert] : alert [Healthy Appearance] : healthy appearance [No Acute Distress] : no acute distress [Normal Sclera/Conjunctiva] : normal sclera/conjunctiva [Normal Hearing] : hearing was normal [Normal Oropharynx] : the oropharynx was normal [No Respiratory Distress] : no respiratory distress [No Spinal Tenderness] : no spinal tenderness [No Stigmata of Cushings Syndrome] : no stigmata of Cushings Syndrome [Normal Gait] : normal gait [Normal Insight/Judgement] : insight and judgment were intact [Kyphosis] : no kyphosis present [Scoliosis] : no scoliosis [Acanthosis Nigricans] : no acanthosis nigricans [de-identified] : no moon facies, no supraclavicular fat pads

## 2024-08-30 NOTE — ADDENDUM
[FreeTextEntry1] : Recent results as below; discussed with Ms. Breen. TSH within the goal range on her current regimen of levothyroxine. Recent bone density as below. There is osteopenia by the World Health Organization criteria. There was a significant decline at the lumbar spine L1-L3 from previous; when L4 is included, there was a -1.5% nonsignificant change at the lumbar spine from previous. Vertebral fracture assessment without evidence of compression fractures. Her 10 year fracture risk calculated by FRAX is 17% for major osteoporotic fracture and 2.5% for hip fracture, below the treatment thresholds. We will continue a "drug holiday" from antiresorptive therapy. 8/30/24  Most recent bone mineral density Date: August 19, 2024 Source: [employer] Site: [facility]  Site	BMD	T-score	Change previous	Change baseline	 Lumbar spine	0.895	-1.1		-7.2%*	 Femoral neck	0.639	-1.9			 Total hip           0.876	-0.5		+1.3%	 Distal radius	0.558	-2.3		+1.8%	 DXA comments: *Significant change from previous; L4 excluded; left hip values Vertebral fracture assessment without evidence of compression fractures T10 to L5 (my read)  Impression: I have personally reviewed the DXA images and report, and I compared these images to a DXA dated August 18, 2022 from [facility]. There is osteopenia by the World Health Organization criteria. There was a significant decline at the lumbar spine L1-L3 from previous; when L4 is included, there was a -1.5% nonsignificant change at the lumbar spine from previous. Vertebral fracture assessment without evidence of compression fractures.

## 2025-08-25 ENCOUNTER — NON-APPOINTMENT (OUTPATIENT)
Age: 65
End: 2025-08-25

## 2025-08-28 ENCOUNTER — NON-APPOINTMENT (OUTPATIENT)
Age: 65
End: 2025-08-28

## 2025-09-02 ENCOUNTER — APPOINTMENT (OUTPATIENT)
Dept: ENDOCRINOLOGY | Facility: CLINIC | Age: 65
End: 2025-09-02
Payer: MEDICARE

## 2025-09-02 VITALS
BODY MASS INDEX: 26.46 KG/M2 | WEIGHT: 131 LBS | DIASTOLIC BLOOD PRESSURE: 75 MMHG | HEART RATE: 55 BPM | SYSTOLIC BLOOD PRESSURE: 110 MMHG

## 2025-09-02 DIAGNOSIS — E03.9 HYPOTHYROIDISM, UNSPECIFIED: ICD-10-CM

## 2025-09-02 DIAGNOSIS — M81.0 AGE-RELATED OSTEOPOROSIS W/OUT CURRENT PATHOLOGICAL FRACTURE: ICD-10-CM

## 2025-09-02 DIAGNOSIS — M85.80 OTHER SPECIFIED DISORDERS OF BONE DENSITY AND STRUCTURE, UNSPECIFIED SITE: ICD-10-CM

## 2025-09-02 PROCEDURE — G2211 COMPLEX E/M VISIT ADD ON: CPT

## 2025-09-02 PROCEDURE — 99214 OFFICE O/P EST MOD 30 MIN: CPT

## 2025-09-09 ENCOUNTER — TRANSCRIPTION ENCOUNTER (OUTPATIENT)
Age: 65
End: 2025-09-09